# Patient Record
Sex: FEMALE | Race: WHITE
[De-identification: names, ages, dates, MRNs, and addresses within clinical notes are randomized per-mention and may not be internally consistent; named-entity substitution may affect disease eponyms.]

---

## 2017-11-12 ENCOUNTER — HEALTH MAINTENANCE LETTER (OUTPATIENT)
Age: 47
End: 2017-11-12

## 2018-08-01 ENCOUNTER — OFFICE VISIT (OUTPATIENT)
Dept: OPHTHALMOLOGY | Facility: CLINIC | Age: 48
End: 2018-08-01
Payer: COMMERCIAL

## 2018-08-01 DIAGNOSIS — Z98.890 S/P LASIK SURGERY: Primary | ICD-10-CM

## 2018-08-01 PROCEDURE — 92015 DETERMINE REFRACTIVE STATE: CPT | Performed by: STUDENT IN AN ORGANIZED HEALTH CARE EDUCATION/TRAINING PROGRAM

## 2018-08-01 PROCEDURE — 92004 COMPRE OPH EXAM NEW PT 1/>: CPT | Performed by: STUDENT IN AN ORGANIZED HEALTH CARE EDUCATION/TRAINING PROGRAM

## 2018-08-01 RX ORDER — FLUTICASONE PROPIONATE 50 MCG
2 SPRAY, SUSPENSION (ML) NASAL
COMMUNITY
Start: 2012-05-30

## 2018-08-01 ASSESSMENT — CONF VISUAL FIELD
OS_NORMAL: 1
OD_NORMAL: 1

## 2018-08-01 ASSESSMENT — REFRACTION_MANIFEST
OD_CYLINDER: +0.25
OD_ADD: +0.75
OS_CYLINDER: SPHERE
OD_AXIS: 032
OD_SPHERE: -0.75
OS_ADD: +0.75
OS_SPHERE: -0.75

## 2018-08-01 ASSESSMENT — VISUAL ACUITY
OS_SC+: -2
OD_SC+: -2
OD_SC: J1+
METHOD: SNELLEN - LINEAR
OS_SC: 20/25
OD_SC: 20/25
OS_SC: J1+

## 2018-08-01 ASSESSMENT — SLIT LAMP EXAM - LIDS
COMMENTS: NORMAL
COMMENTS: NORMAL

## 2018-08-01 ASSESSMENT — TONOMETRY
OS_IOP_MMHG: 14
OD_IOP_MMHG: 14
IOP_METHOD: APPLANATION

## 2018-08-01 ASSESSMENT — CUP TO DISC RATIO
OS_RATIO: 0.3
OD_RATIO: 0.3

## 2018-08-01 ASSESSMENT — EXTERNAL EXAM - RIGHT EYE: OD_EXAM: NORMAL

## 2018-08-01 ASSESSMENT — EXTERNAL EXAM - LEFT EYE: OS_EXAM: NORMAL

## 2018-08-01 NOTE — LETTER
8/1/2018         RE: Shari Johnson  [address]  [facility] [address]        Dear Colleague,    Thank you for referring your patient, Shrai Johnson, to the [facility].    Her eye exam is stable.   Please see a copy of my visit note below.     Current Eye Medications:  none     Subjective: here for complete today, Grandfather with glaucoma, grandmother had AMD.  Last month, she had soreness in the eyes for 2 days, like the muscles were sore in the eyes.  Strained almost. Went away, but wanted to mention.  S/p LASIK both eyes in 1998 in [place].      Objective:  See Ophthalmology Exam.       Assessment:  Shari Johnson is a 48 year old female who presents with:   Encounter Diagnosis   Name Primary?     S/P LASIK surgery, ou        Plan:  Glasses prescription given - optional    Zach Yarbrough MD  [phone]        Again, thank you for allowing me to participate in the care of your patient.        Sincerely,        Zach Yarbrough MD

## 2018-08-01 NOTE — MR AVS SNAPSHOT
After Visit Summary   8/1/2018    Shari Johnson    MRN: [id number]           Patient Information     Date Of Birth          1970        Visit Information        Provider Department      8/1/2018 3:30 PM Zach Yarbrough MD [facility]        Today's Diagnoses     S/P LASIK surgery, ou    -  1      Care Instructions    Glasses prescription given - optional    Zach RICCI. Zenon CHAO  [phone]            Follow-ups after your visit        Follow-up notes from your care team     Return in about 1 year (around 8/1/2019) for Complete Exam.      Who to contact     If you have questions or need follow up information about today's clinic visit or your schedule please contact [facility] directly at [phone].  Normal or non-critical lab and imaging results will be communicated to you by MyChart, letter or phone within 4 business days after the clinic has received the results. If you do not hear from us within 7 days, please contact the clinic through MyChart or phone. If you have a critical or abnormal lab result, we will notify you by phone as soon as possible.  Submit refill requests through [employer] or call your pharmacy and they will forward the refill request to us. Please allow 3 business days for your refill to be completed.          Additional Information About Your Visit        Care EveryWhere ID     This is your Care EveryWhere ID. This could be used by other organizations to access your [facility] medical records  [id number]         Blood Pressure from Last 3 Encounters:   01/10/13 102/64   12/06/11 100/58   06/16/11 110/60    Weight from Last 3 Encounters:   01/10/13 47.5 kg (104 lb 12.8 oz)   12/06/11 46.3 kg (102 lb)   06/16/11 45.8 kg (101 lb)              We Performed the Following     EYE EXAM (SIMPLE-NONBILLABLE)     REFRACTIVE STATUS        Primary Care Provider Office Phone # Fax #    Cecille Dixon [phone] [address] [facility] [address]  [facility] [address]        Equal Access to Services     LEELA MATTHEW : Hadii aad ku hadfidenciosuleiman White, wacasimiroromi sheth, krystenmarsha bowersmaelizabeth reyes. So [facility] [phone].    ATENCIÓN: Si habla español, tiene a sung disposición servicios gratuitos de asistencia lingüística. [facility] [phone].    We comply with applicable federal civil rights laws and [place] laws. We do not discriminate on the basis of race, color, national origin, age, disability, sex, sexual orientation, or gender identity.            Thank you!     Thank you for choosing [facility]  for your care. Our goal is always to provide you with excellent care. Hearing back from our patients is one way we can continue to improve our services. Please take a few minutes to complete the written survey that you may receive in the mail after your visit with us. Thank you!             Your Updated Medication List - Protect others around you: Learn how to safely use, store and throw away your medicines at www.disposemymeds.org.          This list is accurate as of 8/1/18  4:09 PM.  Always use your most recent med list.                   Brand Name Dispense Instructions for use Diagnosis    fluticasone 50 MCG/ACT spray    FLONASE     Spray 2 sprays in nostril        sulfamethoxazole-trimethoprim 400-80 MG per tablet    BACTRIM/SEPTRA    6 tablet    Take 1 tablet by mouth 2 times daily.    Dysuria

## 2018-11-19 ENCOUNTER — HEALTH MAINTENANCE LETTER (OUTPATIENT)
Age: 48
End: 2018-11-19

## 2022-11-16 ENCOUNTER — HOSPITAL ENCOUNTER (OUTPATIENT)
Dept: RADIOLOGY | Facility: HOSPITAL | Age: 52
Discharge: HOME | End: 2022-11-16
Attending: NURSE PRACTITIONER
Payer: COMMERCIAL

## 2022-11-16 DIAGNOSIS — M79.644 PAIN IN RIGHT FINGER(S): Primary | ICD-10-CM

## 2022-11-16 DIAGNOSIS — M79.644 PAIN IN RIGHT FINGER(S): ICD-10-CM

## 2022-11-16 PROCEDURE — 73140 X-RAY EXAM OF FINGER(S): CPT | Mod: TC,RT

## 2022-11-17 DIAGNOSIS — Z87.81 PERSONAL HISTORY OF TRAUMATIC FRACTURE: Primary | ICD-10-CM
